# Patient Record
Sex: MALE | Race: WHITE | NOT HISPANIC OR LATINO | Employment: UNEMPLOYED | URBAN - NONMETROPOLITAN AREA
[De-identification: names, ages, dates, MRNs, and addresses within clinical notes are randomized per-mention and may not be internally consistent; named-entity substitution may affect disease eponyms.]

---

## 2020-10-23 ENCOUNTER — OFFICE VISIT (OUTPATIENT)
Dept: CARDIOLOGY | Facility: CLINIC | Age: 60
End: 2020-10-23

## 2020-10-23 VITALS
BODY MASS INDEX: 42.41 KG/M2 | SYSTOLIC BLOOD PRESSURE: 120 MMHG | OXYGEN SATURATION: 98 % | DIASTOLIC BLOOD PRESSURE: 82 MMHG | HEIGHT: 60 IN | WEIGHT: 216 LBS | HEART RATE: 61 BPM

## 2020-10-23 DIAGNOSIS — I10 ESSENTIAL HYPERTENSION: Primary | ICD-10-CM

## 2020-10-23 DIAGNOSIS — R06.09 DOE (DYSPNEA ON EXERTION): ICD-10-CM

## 2020-10-23 DIAGNOSIS — I45.6 WPW (WOLFF-PARKINSON-WHITE SYNDROME): ICD-10-CM

## 2020-10-23 DIAGNOSIS — R55 PRE-SYNCOPE: ICD-10-CM

## 2020-10-23 DIAGNOSIS — R94.31 PROLONGED Q-T INTERVAL ON ECG: ICD-10-CM

## 2020-10-23 DIAGNOSIS — F32.A ANXIETY AND DEPRESSION: ICD-10-CM

## 2020-10-23 DIAGNOSIS — F41.9 ANXIETY AND DEPRESSION: ICD-10-CM

## 2020-10-23 PROCEDURE — 99204 OFFICE O/P NEW MOD 45 MIN: CPT | Performed by: INTERNAL MEDICINE

## 2020-10-23 PROCEDURE — 93000 ELECTROCARDIOGRAM COMPLETE: CPT | Performed by: INTERNAL MEDICINE

## 2020-10-23 RX ORDER — OLANZAPINE 20 MG/1
20 TABLET ORAL NIGHTLY
COMMUNITY

## 2020-10-23 RX ORDER — LISINOPRIL AND HYDROCHLOROTHIAZIDE 20; 12.5 MG/1; MG/1
1 TABLET ORAL DAILY
COMMUNITY

## 2020-10-23 RX ORDER — FLUOXETINE HYDROCHLORIDE 60 MG/1
10 TABLET, FILM COATED ORAL; ORAL DAILY
COMMUNITY

## 2020-10-23 NOTE — PROGRESS NOTES
Kashif Powell  [id number]  1960  60 y.o.  male    Referring Provider: Montserrat Ortega APRN    Reason for  Visit:  Initial visit for below reasons   Prior Ana-Parkinson-White syndrome   Preoperative cardiovascular clearance for cataract surgery     Subjective    Mild chronic exertional shortness of breath on exertion relieved with rest  No significant cough or wheezing    No palpitations  No associated chest pain  No significant pedal edema    No fever or chills  No significant expectoration    No hemoptysis  1 year ago had presyncopal episode     Intermittent dizzy spells, no presyncope or syncope   Sits down to prevent falling   Occurs 1-2 times a month    Tolerating current medications well with no untoward side effects   Compliant with prescribed medication regimen. Tries to adhere to cardiac diet.     Prior history of Ana-Parkinson-White syndrome     History of present illness:  Kashif Powell is a 60 y.o. yo male with history of Ana-Parkinson-White syndrome  who presents today for   Chief Complaint   Patient presents with   • Syncope     new pt   • Ana-Parkinson-White Syndrome     pt states he was diagnosed in 1990   • Hypertension   • Pre-op Exam     cataract surgery (not yet scheduled)   .    History  Past Medical History:   Diagnosis Date   • Hypertension    ,   Past Surgical History:   Procedure Laterality Date   • NO PAST SURGERIES     ,   Family History   Problem Relation Age of Onset   • No Known Problems Mother    • No Known Problems Father    ,   Social History     Tobacco Use   • Smoking status: Former Smoker   Substance Use Topics   • Alcohol use: Not Currently   • Drug use: Not Currently   ,     Medications  Current Outpatient Medications   Medication Sig Dispense Refill   • FLUoxetine (PROzac) 60 MG tablet Take 10 mg by mouth Daily.     • lisinopril-hydrochlorothiazide (PRINZIDE,ZESTORETIC) 20-12.5 MG per tablet Take 1 tablet by mouth Daily.     • OLANZapine (zyPREXA) 20  "MG tablet Take 20 mg by mouth Every Night.       No current facility-administered medications for this visit.        Allergies:  Patient has no known allergies.    Review of Systems  Review of Systems   Constitution: Negative.   HENT: Negative.    Eyes: Negative.    Cardiovascular: Positive for dyspnea on exertion and near-syncope. Negative for chest pain, claudication, cyanosis, irregular heartbeat, leg swelling, orthopnea, palpitations, paroxysmal nocturnal dyspnea and syncope.   Respiratory: Negative.    Endocrine: Negative.    Hematologic/Lymphatic: Negative.    Skin: Negative.    Gastrointestinal: Negative for anorexia.   Genitourinary: Negative.    Neurological: Negative.    Psychiatric/Behavioral: Negative.        Objective     Physical Exam:  /82   Pulse 61   Ht 68 cm (26.77\")   Wt 98 kg (216 lb)   SpO2 98%   .88 kg/m²     Physical Exam    Results Review:        ____________________________________________________________________________________________________________________________________________  Health maintenance and recommendations    Low salt/ HTN/ Heart healthy carbohydrate restricted cardiac diet   The patient is advised to reduce or avoid caffeine or other cardiac stimulants.   Minimize or avoid  NSAID-type medications      Monitor for any signs of bleeding including red or dark stools. Fall precautions.   Advised staying uptodate with immunizations per established standard guidelines.    Offered to give patient  a copy of my notes     Questions were encouraged, asked and answered to the patient's  understanding and satisfaction. Questions if any regarding current medications and side effects, need for refills and importance of compliance to medications stressed.    Reviewed available prior notes, consults, prior visits, laboratory findings, radiology and cardiology relevant reports. Updated chart as applicable. I have reviewed the patient's medical history in detail and updated " the computerized patient record as relevant.      Updated patient regarding any new or relevant abnormalities on review of records or any new findings on physical exam. Mentioned to patient about purpose of visit and desirable health short and long term goals and objectives.    Primary to monitor CBC CMP Lipid panel and TSH as applicable    ___________________________________________________________________________________________________________________________________________         ECG 12 Lead    Date/Time: 10/23/2020 9:59 AM  Performed by: Lebron Fletcher MD  Authorized by: Lebron Fletcher MD   Comparison: not compared with previous ECG   Rhythm: sinus rhythm  Ectopy: unifocal PVCs  Rate: normal  QRS axis: normal  Other findings: prolonged QTc interval    Clinical impression: abnormal EKG            Assessment/Plan   Diagnoses and all orders for this visit:    1. Essential hypertension (Primary)    2. WPW (Ana-Parkinson-White syndrome)  -     Cancel: Holter Monitor - 72 Hour Up To 21 Days; Future    3. Anxiety and depression    4. ALLISON (dyspnea on exertion)  -     Adult Transthoracic Echo Complete W/ Cont if Necessary Per Protocol; Future    5. Pre-syncope  -     Cancel: Holter Monitor - 72 Hour Up To 21 Days; Future  -     Holter Monitor - 72 Hour Up To 21 Days; Future    6. Prolonged Q-T interval on ECG    Other orders  -     ECG 12 Lead            Plan    Orders Placed This Encounter   Procedures   • Holter Monitor - 72 Hour Up To 21 Days     Standing Status:   Future     Standing Expiration Date:   10/23/2021     Order Specific Question:   Reason for exam?     Answer:   Presyncope or Syncope   • ECG 12 Lead     This order was created via procedure documentation   • Adult Transthoracic Echo Complete W/ Cont if Necessary Per Protocol     Myocardial strain to be performed during echocardiogram as long as technically feasible     Standing Status:   Future     Standing Expiration Date:   10/23/2021     Order  Specific Question:   Reason for exam?     Answer:   Dyspnea      Call for results of cardiac tests after done for clearance for surgery           Return in about 6 weeks (around 12/4/2020).

## 2020-12-15 ENCOUNTER — TELEPHONE (OUTPATIENT)
Dept: CARDIOLOGY | Facility: CLINIC | Age: 60
End: 2020-12-15

## 2020-12-15 NOTE — TELEPHONE ENCOUNTER
Patient needs sx clearance for Cataracts.       Cinthya   [facility]   Fax [phone]   Statement Selected

## 2022-12-01 ENCOUNTER — OFFICE VISIT (OUTPATIENT)
Dept: CARDIOLOGY | Facility: CLINIC | Age: 62
End: 2022-12-01

## 2022-12-01 VITALS
HEIGHT: 60 IN | SYSTOLIC BLOOD PRESSURE: 120 MMHG | DIASTOLIC BLOOD PRESSURE: 78 MMHG | OXYGEN SATURATION: 94 % | RESPIRATION RATE: 18 BRPM | BODY MASS INDEX: 39.27 KG/M2 | WEIGHT: 200 LBS | HEART RATE: 74 BPM

## 2022-12-01 DIAGNOSIS — I49.3 PREMATURE VENTRICULAR CONTRACTIONS: ICD-10-CM

## 2022-12-01 DIAGNOSIS — I47.29 NSVT (NONSUSTAINED VENTRICULAR TACHYCARDIA): ICD-10-CM

## 2022-12-01 DIAGNOSIS — I49.1 PREMATURE ATRIAL CONTRACTIONS: ICD-10-CM

## 2022-12-01 DIAGNOSIS — R55 PRE-SYNCOPE: ICD-10-CM

## 2022-12-01 DIAGNOSIS — I45.6 WPW (WOLFF-PARKINSON-WHITE SYNDROME): Primary | ICD-10-CM

## 2022-12-01 DIAGNOSIS — R00.2 PALPITATIONS: ICD-10-CM

## 2022-12-01 PROCEDURE — 99204 OFFICE O/P NEW MOD 45 MIN: CPT | Performed by: STUDENT IN AN ORGANIZED HEALTH CARE EDUCATION/TRAINING PROGRAM

## 2022-12-01 PROCEDURE — 93000 ELECTROCARDIOGRAM COMPLETE: CPT | Performed by: STUDENT IN AN ORGANIZED HEALTH CARE EDUCATION/TRAINING PROGRAM

## 2022-12-01 NOTE — PROGRESS NOTES
"Chief Complaint  Rapid Heart Rate (NP ) and Palpitations    Subjective        History of Present Illness    EP Problems:  1.  ? WPW  2.  NSVT  3.  Paroxysmal SVT  4.  PACs  5.  Recurrent syncope    Cardiology Problems:  1.  HTN    Medical Problems:  1.  Anxiety    Kashif Powell is a 62 y.o. male with problem list as above who presents to the clinic for evaluation of palpitations, reported history of WPW, NSVT, and recurrent syncope and near syncope.  He states that he has been having trouble for approximately 20 years.  He was previously diagnosed with WPW though states that he never had any procedures done to treat this including ablation.  He is to have frequent episodes of complete loss of consciousness though this is improved over the years and has not had an episode of true syncope in approximately 2-1/2 years.  He does get frequent episodes where he feels like his heart is skipping or racing which lasts for a short period of time and occurs approximately every other day.  He has near syncope with these episodes.  He wore a Holter monitor which revealed evidence of NSVT up to 8 beats in duration.  A stress echocardiogram was performed which revealed a normal stress test result with normal EF.    Objective   Vital Signs:  /78 (BP Location: Right arm, Patient Position: Sitting)   Pulse 74   Resp 18   Ht 66 cm (25.98\")   Wt 90.7 kg (200 lb)   SpO2 94%   .27 kg/m²   Estimated body mass index is 208.27 kg/m² as calculated from the following:    Height as of this encounter: 66 cm (25.98\").    Weight as of this encounter: 90.7 kg (200 lb).      Physical Exam  Vitals reviewed.   Constitutional:       Appearance: Normal appearance.      Comments: Wearing shackles, incarcerated   HENT:      Head: Normocephalic and atraumatic.   Eyes:      Extraocular Movements: Extraocular movements intact.      Conjunctiva/sclera: Conjunctivae normal.   Cardiovascular:      Rate and Rhythm: Normal rate and " regular rhythm.      Pulses: Normal pulses.      Heart sounds: Normal heart sounds.   Pulmonary:      Effort: Pulmonary effort is normal.      Breath sounds: Normal breath sounds.   Musculoskeletal:         General: No swelling.   Neurological:      General: No focal deficit present.      Mental Status: He is alert and oriented to person, place, and time.   Psychiatric:         Mood and Affect: Mood normal.         Judgment: Judgment normal.        Result Review :  The following data was reviewed by: Tracy Joshua MD on 12/01/2022:    Prior echo results 5/2022 reviewed: Normal ejection fraction, normal LV size  Prior stress test results 6/2022 reviewed: Normal EF, normal cardiac augmentation with dobutamine  Prior ECG directly visualized and independently interpreted: Sinus rhythm with PACs  Prior Holter monitor results 3/2022 reviewed: Frequent PACs (13%), frequent PVCs (5.5%), NSVT up to 8 beats in duration, 2 runs of paroxysmal SVT (7 beats in duration)      ECG 12 Lead    Date/Time: 12/1/2022 9:45 AM  Performed by: Tracy Joshua MD  Authorized by: Tracy Joshua MD   Comparison: compared with previous ECG from 10/23/2020  Similar to previous ECG  Rhythm: sinus rhythm  Ectopy: atrial premature contractions  Conduction: conduction normal  Other findings: left ventricular hypertrophy with strain    Clinical impression: abnormal EKG                Assessment and Plan   Diagnoses and all orders for this visit:    1. WPW (Ana-Parkinson-White syndrome) (Primary)  -     ECG 12 Lead    2. Pre-syncope    3. Palpitations    4. NSVT (nonsustained ventricular tachycardia)    5. Premature ventricular contractions    6. Premature atrial contractions    Other orders  -     metoprolol tartrate (LOPRESSOR) 25 MG tablet; Take 1 tablet by mouth 2 (Two) Times a Day.  Dispense: 60 tablet; Refill: 11        Kashif Powell is a 62 y.o. male with problem list as above who presents to the clinic for evaluation of palpitations,  presyncope, and SVT, PACs and PVCs, reported history of Ana-Parkinson-White syndrome.  Review of his imaging and electrical recordings does not show evidence of manifest WPW.  While this is still a possible diagnosis as it could be concealed, I have not seen other evidence to suggest this including sustained episodes of SVT.  Given his frequent palpitations and near syncope, I do think that it would be reasonable to empirically treat him with a beta-blocker.  It is unclear to me exactly what is triggering these episodes given that his NSVT runs are fairly short, though this is still possible.  I have encouraged him to call me if he has episodes of complete loss of consciousness given that we have not seen any sustained episodes on multiple monitors.  Consideration of ILR could be reasonable if he were to have more complete episodes of loss of consciousness.  Without documented sustained arrhythmias, I do not think that the risk of an EP study is worth the benefit at this time.    Plan:  -Start metoprolol 25 mg twice daily  -Call me if he has more episodes of complete loss of consciousness  - Will discuss the treatment plan with Zuri Isidro         Follow Up   Return in about 3 months (around 3/1/2023).  Patient was given instructions and counseling regarding his condition or for health maintenance advice. Please see specific information pulled into the AVS if appropriate.     Part of this note may be an electronic transcription/translation of spoken language to printed text using the Dragon Dictation System.

## 2022-12-01 NOTE — PATIENT INSTRUCTIONS
3 month follow up  Start metoprolol 25mg twice daily  Contact our clinic if you have more passing out episodes prior to this time

## 2023-03-03 ENCOUNTER — OFFICE VISIT (OUTPATIENT)
Dept: CARDIOLOGY | Facility: CLINIC | Age: 63
End: 2023-03-03
Payer: MEDICAID

## 2023-03-03 VITALS
WEIGHT: 195 LBS | BODY MASS INDEX: 38.28 KG/M2 | DIASTOLIC BLOOD PRESSURE: 74 MMHG | SYSTOLIC BLOOD PRESSURE: 102 MMHG | HEART RATE: 64 BPM | OXYGEN SATURATION: 99 % | HEIGHT: 60 IN

## 2023-03-03 DIAGNOSIS — R55 PRE-SYNCOPE: ICD-10-CM

## 2023-03-03 DIAGNOSIS — R00.2 PALPITATIONS: ICD-10-CM

## 2023-03-03 DIAGNOSIS — I45.6 WPW (WOLFF-PARKINSON-WHITE SYNDROME): Primary | ICD-10-CM

## 2023-03-03 PROCEDURE — 99214 OFFICE O/P EST MOD 30 MIN: CPT | Performed by: PHYSICIAN ASSISTANT

## 2023-03-03 PROCEDURE — 93000 ELECTROCARDIOGRAM COMPLETE: CPT | Performed by: PHYSICIAN ASSISTANT

## 2023-03-03 NOTE — PROGRESS NOTES
"[facility] -  CLINIC FOLLOW UP     Patient Care Team:  Montserrat Ortega APRN as PCP - General (Family Medicine)  Zuri Isidro APRN as Referring Physician (Family Medicine)    Chief Complaint:     Subjective    EP Problems:  1.  ? WPW  2.  NSVT  3.  Paroxysmal SVT  4.  PACs  5.  Recurrent syncope     Cardiology Problems:  1.  HTN     Medical Problems:  1.  Anxiety    HPI: Today I had the pleasure of seeing Kashif Powell in the cardiology clinic for follow up. He is a 63 year old inmate with a history of of WPW, NSVT, and recurrent syncope and near syncope.  He states that he has been having trouble for approximately 20 years.  He was previously diagnosed with WPW though states that he never had any procedures done to treat this including ablation.  He is to have frequent episodes of complete loss of consciousness though this is improved over the years and has not had an episode of true syncope in approximately 2-1/2 years.  He does get frequent episodes where he feels like his heart is skipping or racing which lasts for a short period of time and occurs approximately every other day.      His ischemic work up has been unremarkable. A Holter monitor was performed in December demonstrating a 6.7% burden of PACs and <1% ventricular ectopy burden. No pauses or arrhythmias were seen over 12 days.     His EKG did not demonstrate accessory pathway, but it could be concealed or intermittent. Due to the syncope history, he was started on a beta blocker. The patient's biggest complaint is fatigue since beta blocker initiated. In the facility, he denies any recurrent syncope and states he last had an episode 2 years ago.     He states his release date is in about 5-6 weeks and he plans to live in [place].       Objective     Visit Vitals  /74   Pulse 64   Ht 66 cm (25.98\")   Wt 88.5 kg (195 lb)   SpO2 99%   .06 kg/m²           Vitals reviewed.   Constitutional:       " Appearance: Healthy appearance. Not in distress.   Eyes:      Extraocular Movements: Extraocular movements intact.      Conjunctiva/sclera: Conjunctivae normal.      Pupils: Pupils are equal, round, and reactive to light.   HENT:      Head: Normocephalic and atraumatic.      Nose: Nose normal.    Mouth/Throat:      Lips: Pink.      Mouth: Mucous membranes are moist.      Pharynx: Oropharynx is clear.   Neck:      Vascular: No carotid bruit or JVD. JVD normal.   Pulmonary:      Effort: Pulmonary effort is normal.      Breath sounds: Normal breath sounds.   Chest:      Chest wall: Not tender to palpatation.   Cardiovascular:      PMI at left midclavicular line. Normal rate. Regular rhythm. Normal S1. Normal S2.      Murmurs: There is no murmur.      No gallop. No rub.   Pulses:     Radial: 2+ bilaterally.     Posterior tibial: 2+ bilaterally.  Abdominal:      General: Bowel sounds are normal.      Palpations: Abdomen is soft.   Musculoskeletal: Normal range of motion.      Extremities: No clubbing present.     Cervical back: Normal range of motion. Skin:     General: Skin is warm and dry.   Neurological:      General: No focal deficit present.      Mental Status: Alert and oriented to person, place, and time.      Cranial Nerves: Cranial nerves are intact.   Psychiatric:         Attention and Perception: Attention normal.         Mood and Affect: Affect normal.         Speech: Speech normal.         Behavior: Behavior normal.         Cognition and Memory: Cognition normal.       Patient was seen and examined by ELIE Yung.       The following portions of the patient's history were reviewed and updated as appropriate: allergies, current medications, past medical history, past social history, past and problem list.     Review of Systems   Constitutional: Negative.    HENT: Negative.    Eyes: Negative.    Respiratory: Negative.    Cardiovascular: Negative.    Gastrointestinal: Negative.    Endocrine:  Negative.    Genitourinary: Negative.    Musculoskeletal: Negative.    Skin: Negative.    Allergic/Immunologic: Negative.    Neurological: Negative.    Hematological: Negative.    Psychiatric/Behavioral: Negative.             ECG 12 Lead    Date/Time: 3/3/2023 2:48 PM  Performed by: Mary Daniel PA  Authorized by: Mary Daniel PA   Comparison: compared with previous ECG from 12/1/2022  Rhythm: sinus rhythm  Rate: normal  QRS axis: normal  Other findings: non-specific ST-T wave changes and T wave abnormality    Clinical impression: abnormal EKG                 Medication Review: yes    Current Outpatient Medications:   •  FLUoxetine (PROzac) 60 MG tablet, Take 10 mg by mouth Daily., Disp: , Rfl:   •  lisinopril-hydrochlorothiazide (PRINZIDE,ZESTORETIC) 20-12.5 MG per tablet, Take 1 tablet by mouth Daily., Disp: , Rfl:   •  metoprolol tartrate (LOPRESSOR) 25 MG tablet, Take 1 tablet by mouth 2 (Two) Times a Day., Disp: 60 tablet, Rfl: 11  •  OLANZapine (zyPREXA) 20 MG tablet, Take 1 tablet by mouth Every Night., Disp: , Rfl:    No Known Allergies    I have reviewed       No results found for: GLUCOSE, CALCIUM, NA, K, CO2, CL, BUN, CREATININE, EGFRRESULT, EGFR, BCR, ANIONGAP       Assessment:   Diagnoses and all orders for this visit:    1. WPW (Ana-Parkinson-White syndrome) (Primary)    2. Palpitations    3. Pre-syncope      WPW: reported history, but no evidence of accessory pathway on EKG currently. No evidence of sustained SVT on recent Holter monitor.     NSVT: Noted on Holter monitor in December 2022. Negative ischemic work up. No recurrent syncope. Can consider ILR implant to monitor for recurrent syncope and arrhythmias if he has another episode, but states it's been 2 years since his last episode. At this time, the patient will continue his metoprolol. Without documented sustained arrhythmias, Dr. Joshua did not think that the risk of an EP study is worth the benefit at this time.    Follow up in 6  months with Dr. Joshua once he is released.   I spent 30 minutes caring for Kashif on this date of service. This time includes time spent by me in the following activities:preparing for the visit, reviewing tests, obtaining and/or reviewing a separately obtained history, performing a medically appropriate examination and/or evaluation , counseling and educating the patient/family/caregiver, ordering medications, tests, or procedures, referring and communicating with other health care professionals  and documenting information in the medical record        Electronically signed by ELIE Smith